# Patient Record
Sex: FEMALE | URBAN - METROPOLITAN AREA
[De-identification: names, ages, dates, MRNs, and addresses within clinical notes are randomized per-mention and may not be internally consistent; named-entity substitution may affect disease eponyms.]

---

## 2019-07-16 ENCOUNTER — OFFICE VISIT (OUTPATIENT)
Dept: FAMILY MEDICINE CLINIC | Age: 39
End: 2019-07-16

## 2019-07-16 VITALS
WEIGHT: 208 LBS | RESPIRATION RATE: 17 BRPM | OXYGEN SATURATION: 97 % | HEART RATE: 67 BPM | DIASTOLIC BLOOD PRESSURE: 87 MMHG | HEIGHT: 65 IN | TEMPERATURE: 97.4 F | BODY MASS INDEX: 34.66 KG/M2 | SYSTOLIC BLOOD PRESSURE: 129 MMHG

## 2019-07-16 DIAGNOSIS — E66.9 OBESITY (BMI 30.0-34.9): ICD-10-CM

## 2019-07-16 DIAGNOSIS — R31.21 ASYMPTOMATIC MICROSCOPIC HEMATURIA: ICD-10-CM

## 2019-07-16 DIAGNOSIS — Z00.00 ANNUAL PHYSICAL EXAM: Primary | ICD-10-CM

## 2019-07-16 NOTE — PROGRESS NOTES
Alissa Vanegas     Chief Complaint   Patient presents with   [address]    Physical     Vitals:    19 0906   BP: 129/87   Pulse: 67   Resp: 17   Temp: 97.4 °F (36.3 °C)   TempSrc: Oral   SpO2: 97%   Weight: 208 lb (94.3 kg)   Height: 5' 5\" (1.651 m)   PainSc:   0 - No pain   LMP: 2019         HPI: she is here to establish acre and get annual physical has not seen a doctor in 10 years. She had pap 10 years ago    works as LPN ,generally healthy with no medications , does not smoke  not on any medications  And has no acute complains     History reviewed. No pertinent past medical history. Past Surgical History:   Procedure Laterality Date    HX  SECTION       Social History     Tobacco Use    Smoking status: Never Smoker    Smokeless tobacco: Never Used   Substance Use Topics    Alcohol use: Not Currently     Comment: occasionally       Family History   Problem Relation Age of Onset    Kidney Disease Mother     Hypertension Mother     Cancer Father        Review of Systems   Constitutional: Negative for chills, fever, malaise/fatigue and weight loss. HENT: Negative for congestion, ear discharge, ear pain, hearing loss, nosebleeds, sinus pain and sore throat. Eyes: Negative for blurred vision, double vision and discharge. Respiratory: Negative for cough, hemoptysis, sputum production, shortness of breath and wheezing. Cardiovascular: Negative for chest pain, palpitations, claudication and leg swelling. Gastrointestinal: Negative for abdominal pain, blood in stool, constipation, diarrhea, nausea and vomiting. Genitourinary: Negative for dysuria, flank pain, frequency, hematuria and urgency. Musculoskeletal: Negative for back pain, joint pain, myalgias and neck pain. Skin: Negative for itching and rash. Neurological: Negative for dizziness, tingling, tremors, sensory change, speech change, focal weakness, seizures, weakness and headaches.    Psychiatric/Behavioral: Negative for depression, hallucinations, substance abuse and suicidal ideas. The patient is not nervous/anxious and does not have insomnia. Physical Exam   Constitutional: She is oriented to person, place, and time. She appears well-developed and well-nourished. No distress. HENT:   Head: Normocephalic and atraumatic. Mouth/Throat: Oropharynx is clear and moist. No oropharyngeal exudate. Eyes: Pupils are equal, round, and reactive to light. Conjunctivae are normal. Right eye exhibits no discharge. Left eye exhibits no discharge. No scleral icterus. Neck: Normal range of motion. Neck supple. No thyromegaly present. Cardiovascular: Normal rate, regular rhythm and normal heart sounds. No murmur heard. Pulmonary/Chest: Effort normal and breath sounds normal. No respiratory distress. She has no wheezes. She has no rales. She exhibits no tenderness. Abdominal: Soft. Bowel sounds are normal. She exhibits no distension. There is no tenderness. There is no rebound. Musculoskeletal: Normal range of motion. She exhibits no edema, tenderness or deformity. Lymphadenopathy:     She has no cervical adenopathy. Neurological: She is alert and oriented to person, place, and time. No cranial nerve deficit. Coordination normal.   Skin: Skin is warm and dry. No rash noted. She is not diaphoretic. No erythema. No pallor. Psychiatric: She has a normal mood and affect. Her behavior is normal. Judgment and thought content normal.   Nursing note and vitals reviewed. Assessment and plan     Plan of care has been discussed with the patient, he agrees to the plan and verbalized understanding. All his questions were answered  More than 50% of the time spent in this visit was counseling the patient about  illness and treatment options         1. Annual physical exam    - CBC WITH AUTOMATED DIFF; Future  - METABOLIC PANEL, COMPREHENSIVE; Future  - LIPID PANEL; Future  - URINALYSIS W/MICROSCOPIC;  Future  - URINALYSIS W/MICROSCOPIC  - LIPID PANEL  - METABOLIC PANEL, COMPREHENSIVE  - CBC WITH AUTOMATED DIFF    2. Obesity (BMI 30.0-34.9)  I have reviewed/discussed the above normal BMI with the patient. I have recommended the following interventions: dietary management education, guidance, and counseling, encourage exercise and monitor weight . Inga Sterling Patient declined dietitian referral today. Patient Active Problem List    Diagnosis Date Noted    Obesity (BMI 30.0-34.9) 07/16/2019     Results for orders placed or performed in visit on 07/16/19   URINALYSIS W/MICROSCOPIC   Result Value Ref Range    Specific Gravity 1.024 1.005 - 1.03    pH (UA) 7.0 5.0 - 8.0 pH    Protein Negative Negative, mg/dL    Glucose Negative Negative mg/dL    Ketone Negative Negative, mg/dL    Bilirubin Negative Negative    Blood Small (A) Negative    Nitrites Negative Negative    Leukocyte Esterase Negative Negative    Urobilinogen 2.0 (H) <2.0 mg/dL    WBC 0-2 0 - 2 /hpf    RBC 10-20 (A) Negative, /hpf    Epithelial cells 0-2 0 - 2 /hpf   LIPID PANEL   Result Value Ref Range    Triglyceride 43 40 - 149 mg/dL    HDL Cholesterol 51 40 - 59 mg/dL    Cholesterol, total 152 110 - 200 mg/dL    CHOLESTEROL/HDL 3.0 0.0 - 5.0    LDL, calculated 93 50 - 99 mg/dL    VLDL, calculated 9 8 - 30 mg/dL   METABOLIC PANEL, COMPREHENSIVE   Result Value Ref Range    Glucose 90 70 - 99 mg/dL    BUN 9 6 - 22 mg/dL    Creatinine 0.4 (L) 0.5 - 1.2 mg/dL    Sodium 141 133 - 145 mmol/L    Potassium 4.6 3.5 - 5.5 mmol/L    Chloride 104 98 - 110 mmol/L    CO2 26 20 - 32 mmol/L    AST (SGOT) 17 10 - 37 U/L    ALT (SGPT) 12 5 - 40 U/L    Alk.  phosphatase 115 25 - 115 U/L    Bilirubin, total 0.3 0.2 - 1.2 mg/dL    Calcium 9.3 8.4 - 10.5 mg/dL    Protein, total 6.7 6.4 - 8.3 g/dL    Albumin 4.0 3.5 - 5.0 g/dL    A-G Ratio 1.5 1.1 - 2.6 ratio    Globulin 2.7 2.0 - 4.0 g/dL    Anion gap 11.0 mmol/L    GFRAA >60.0 >60.0    GFRNA >60.0 >60.0   CBC WITH AUTOMATED DIFF Result Value Ref Range    WBC 4.6 4.0 - 11.0 K/uL    RBC 4.14 3.80 - 5.20 M/uL    HGB 11.6 (L) 11.7 - 15.5 g/dL    HCT 36.6 35.1 - 46.5 %    MCV 88 80 - 95 fL    MCH 28 26 - 34 pg    MCHC 32 31 - 36 g/dL    RDW 14.8 10.0 - 15.5 %    PLATELET 387 510 - 487 K/uL    MPV 11.0 9.0 - 13.0 fL    NEUTROPHILS 50 40 - 75 %    Lymphocytes 42 20 - 45 %    MONOCYTES 6 3 - 12 %    EOSINOPHILS 2 0 - 6 %    BASOPHILS 0 0 - 2 %    ABS. NEUTROPHILS 2.3 1.8 - 7.7 K/uL    ABSOLUTE LYMPHOCYTE COUNT 1.9 1.0 - 4.8 K/uL    ABS. MONOCYTES 0.3 0.1 - 1.0 K/uL    ABS. EOSINOPHILS 0.1 0.0 - 0.5 K/uL    ABS. BASOPHILS 0.0 0.0 - 0.2 K/uL     Office Visit on 07/16/2019   Component Date Value Ref Range Status    Specific Gravity 07/16/2019 1.024  1.005 - 1.03 Final    pH (UA) 07/16/2019 7.0  5.0 - 8.0 pH Final    Protein 07/16/2019 Negative  Negative, mg/dL Final    Glucose 07/16/2019 Negative  Negative mg/dL Final    Ketone 07/16/2019 Negative  Negative, mg/dL Final    Bilirubin 07/16/2019 Negative  Negative Final    Blood 07/16/2019 Small* Negative Final    Nitrites 07/16/2019 Negative  Negative Final    Leukocyte Esterase 07/16/2019 Negative  Negative Final    Urobilinogen 07/16/2019 2.0* <2.0 mg/dL Final    WBC 07/16/2019 0-2  0 - 2 /hpf Final    RBC 07/16/2019 10-20* Negative, /hpf Final    Epithelial cells 07/16/2019 0-2  0 - 2 /hpf Final    Triglyceride 07/16/2019 43  40 - 149 mg/dL Final    HDL Cholesterol 07/16/2019 51  40 - 59 mg/dL Final    Cholesterol, total 07/16/2019 152  110 - 200 mg/dL Final    CHOLESTEROL/HDL 07/16/2019 3.0  0.0 - 5.0 Final    LDL, calculated 07/16/2019 93  50 - 99 mg/dL Final    VLDL, calculated 07/16/2019 9  8 - 30 mg/dL Final    Comment: Cholesterol Recommended NCEP guidelines in mg/dL:  Less than 200            Desirable  200 - 239                Borderline High  Greater than or  = 240   High  Please Note:  Total Chol/HDL Ratio                   Men     Women  1/2 Avg.  Risk    3.4     3.3 Avg. Risk    5.0     4.4  2X  Avg. Risk    9.6     7.1  3X  Avg. Risk   23.4    11.0      Glucose 07/16/2019 90  70 - 99 mg/dL Final    BUN 07/16/2019 9  6 - 22 mg/dL Final    Creatinine 07/16/2019 0.4* 0.5 - 1.2 mg/dL Final    Sodium 07/16/2019 141  133 - 145 mmol/L Final    Potassium 07/16/2019 4.6  3.5 - 5.5 mmol/L Final    Chloride 07/16/2019 104  98 - 110 mmol/L Final    CO2 07/16/2019 26  20 - 32 mmol/L Final    AST (SGOT) 07/16/2019 17  10 - 37 U/L Final    ALT (SGPT) 07/16/2019 12  5 - 40 U/L Final    Alk. phosphatase 07/16/2019 115  25 - 115 U/L Final    Bilirubin, total 07/16/2019 0.3  0.2 - 1.2 mg/dL Final    Calcium 07/16/2019 9.3  8.4 - 10.5 mg/dL Final    Protein, total 07/16/2019 6.7  6.4 - 8.3 g/dL Final    Albumin 07/16/2019 4.0  3.5 - 5.0 g/dL Final    A-G Ratio 07/16/2019 1.5  1.1 - 2.6 ratio Final    Globulin 07/16/2019 2.7  2.0 - 4.0 g/dL Final    Anion gap 07/16/2019 11.0  mmol/L Final    Comment: Estimated GFR results are reported in mL/min/1.73 sq.m. by the MDRD equation. This eGFR is validated for stable chronic renal failure patients. This   equation  is unreliable in acute illness or patients with normal renal function.  GFRAA 07/16/2019 >60.0  >60.0 Final    GFRNA 07/16/2019 >60.0  >60.0 Final    WBC 07/16/2019 4.6  4.0 - 11.0 K/uL Final    RBC 07/16/2019 4.14  3.80 - 5.20 M/uL Final    HGB 07/16/2019 11.6* 11.7 - 15.5 g/dL Final    HCT 07/16/2019 36.6  35.1 - 46.5 % Final    MCV 07/16/2019 88  80 - 95 fL Final    MCH 07/16/2019 28  26 - 34 pg Final    MCHC 07/16/2019 32  31 - 36 g/dL Final    RDW 07/16/2019 14.8  10.0 - 15.5 % Final    PLATELET 41/04/2206 481  140 - 440 K/uL Final    MPV 07/16/2019 11.0  9.0 - 13.0 fL Final    NEUTROPHILS 07/16/2019 50  40 - 75 % Final    Lymphocytes 07/16/2019 42  20 - 45 % Final    MONOCYTES 07/16/2019 6  3 - 12 % Final    EOSINOPHILS 07/16/2019 2  0 - 6 % Final    BASOPHILS 07/16/2019 0  0 - 2 % Final    ABS. NEUTROPHILS 07/16/2019 2.3  1.8 - 7.7 K/uL Final    ABSOLUTE LYMPHOCYTE COUNT 07/16/2019 1.9  1.0 - 4.8 K/uL Final    ABS. MONOCYTES 07/16/2019 0.3  0.1 - 1.0 K/uL Final    ABS. EOSINOPHILS 07/16/2019 0.1  0.0 - 0.5 K/uL Final    ABS.  BASOPHILS 07/16/2019 0.0  0.0 - 0.2 K/uL Final          Follow-up and Dispositions    · Return in about 1 month (around 8/16/2019), or pap and physical .

## 2019-07-16 NOTE — PROGRESS NOTES
Yaquelin Dorado is a 34 y.o. female presents to office for physical    Medication list has been reviewed with Yaquelin Dorado and updated as of today's date     Health Maintenance items with a due date reviewed with patient:  Health Maintenance Due   Topic Date Due    DTaP/Tdap/Td series (1 - Tdap) 11/19/1999    PAP AKA CERVICAL CYTOLOGY  11/19/1999

## 2019-07-17 LAB
A-G RATIO,AGRAT: 1.5 RATIO (ref 1.1–2.6)
ABSOLUTE LYMPHOCYTE COUNT, 10803: 1.9 K/UL (ref 1–4.8)
ALBUMIN SERPL-MCNC: 4 G/DL (ref 3.5–5)
ALP SERPL-CCNC: 115 U/L (ref 25–115)
ALT SERPL-CCNC: 12 U/L (ref 5–40)
ANION GAP SERPL CALC-SCNC: 11 MMOL/L
AST SERPL W P-5'-P-CCNC: 17 U/L (ref 10–37)
BASOPHILS # BLD: 0 K/UL (ref 0–0.2)
BASOPHILS NFR BLD: 0 % (ref 0–2)
BILIRUB SERPL-MCNC: 0.3 MG/DL (ref 0.2–1.2)
BILIRUB UR QL: NEGATIVE
BUN SERPL-MCNC: 9 MG/DL (ref 6–22)
CALCIUM SERPL-MCNC: 9.3 MG/DL (ref 8.4–10.5)
CHLORIDE SERPL-SCNC: 104 MMOL/L (ref 98–110)
CHOLEST SERPL-MCNC: 152 MG/DL (ref 110–200)
CO2 SERPL-SCNC: 26 MMOL/L (ref 20–32)
CREAT SERPL-MCNC: 0.4 MG/DL (ref 0.5–1.2)
EOSINOPHIL # BLD: 0.1 K/UL (ref 0–0.5)
EOSINOPHIL NFR BLD: 2 % (ref 0–6)
EPITHELIAL,EPSU: ABNORMAL /HPF (ref 0–2)
ERYTHROCYTE [DISTWIDTH] IN BLOOD BY AUTOMATED COUNT: 14.8 % (ref 10–15.5)
GFRAA, 66117: >60
GFRNA, 66118: >60
GLOBULIN,GLOB: 2.7 G/DL (ref 2–4)
GLUCOSE SERPL-MCNC: 90 MG/DL (ref 70–99)
GLUCOSE UR QL: NEGATIVE MG/DL
GRANULOCYTES,GRANS: 50 % (ref 40–75)
HCT VFR BLD AUTO: 36.6 % (ref 35.1–46.5)
HDLC SERPL-MCNC: 3 MG/DL (ref 0–5)
HDLC SERPL-MCNC: 51 MG/DL (ref 40–59)
HGB BLD-MCNC: 11.6 G/DL (ref 11.7–15.5)
HGB UR QL STRIP: ABNORMAL
KETONES UR QL STRIP.AUTO: NEGATIVE MG/DL
LDLC SERPL CALC-MCNC: 93 MG/DL (ref 50–99)
LEUKOCYTE ESTERASE: NEGATIVE
LYMPHOCYTES, LYMLT: 42 % (ref 20–45)
MCH RBC QN AUTO: 28 PG (ref 26–34)
MCHC RBC AUTO-ENTMCNC: 32 G/DL (ref 31–36)
MCV RBC AUTO: 88 FL (ref 80–95)
MONOCYTES # BLD: 0.3 K/UL (ref 0.1–1)
MONOCYTES NFR BLD: 6 % (ref 3–12)
NEUTROPHILS # BLD AUTO: 2.3 K/UL (ref 1.8–7.7)
NITRITE UR QL STRIP.AUTO: NEGATIVE
PH UR STRIP: 7 PH (ref 5–8)
PLATELET # BLD AUTO: 296 K/UL (ref 140–440)
PMV BLD AUTO: 11 FL (ref 9–13)
POTASSIUM SERPL-SCNC: 4.6 MMOL/L (ref 3.5–5.5)
PROT SERPL-MCNC: 6.7 G/DL (ref 6.4–8.3)
PROT UR QL STRIP: NEGATIVE MG/DL
RBC # BLD AUTO: 4.14 M/UL (ref 3.8–5.2)
RBC #/AREA URNS HPF: ABNORMAL /HPF
SODIUM SERPL-SCNC: 141 MMOL/L (ref 133–145)
SP GR UR: 1.02 (ref 1–1.03)
TRIGL SERPL-MCNC: 43 MG/DL (ref 40–149)
UROBILINOGEN UR STRIP-MCNC: 2 MG/DL
VLDLC SERPL CALC-MCNC: 9 MG/DL (ref 8–30)
WBC # BLD AUTO: 4.6 K/UL (ref 4–11)
WBC URNS QL MICRO: ABNORMAL /HPF (ref 0–2)

## 2019-07-22 DIAGNOSIS — R31.21 ASYMPTOMATIC MICROSCOPIC HEMATURIA: ICD-10-CM

## 2019-07-22 NOTE — PROGRESS NOTES
Results are within normal limits    Except some blood in the urine patient was menstruating please ignore, otherwise patient repeating urinalysis.

## 2019-08-02 ENCOUNTER — TELEPHONE (OUTPATIENT)
Dept: FAMILY MEDICINE CLINIC | Age: 39
End: 2019-08-02

## 2019-08-02 NOTE — TELEPHONE ENCOUNTER
----- Message from Carolina Leavitt MD sent at 7/22/2019  6:14 AM EDT -----  Results are within normal limits    Except some blood in the urine patient was menstruating please ignore, otherwise patient repeating urinalysis.

## 2021-06-10 ENCOUNTER — OFFICE VISIT (OUTPATIENT)
Dept: FAMILY MEDICINE CLINIC | Age: 41
End: 2021-06-10
Payer: COMMERCIAL

## 2021-06-10 VITALS
TEMPERATURE: 97.6 F | BODY MASS INDEX: 34.16 KG/M2 | RESPIRATION RATE: 16 BRPM | WEIGHT: 205 LBS | OXYGEN SATURATION: 100 % | HEART RATE: 81 BPM | DIASTOLIC BLOOD PRESSURE: 87 MMHG | HEIGHT: 65 IN | SYSTOLIC BLOOD PRESSURE: 135 MMHG

## 2021-06-10 DIAGNOSIS — Z00.00 ANNUAL PHYSICAL EXAM: Primary | ICD-10-CM

## 2021-06-10 DIAGNOSIS — Z11.59 NEED FOR HEPATITIS C SCREENING TEST: ICD-10-CM

## 2021-06-10 DIAGNOSIS — Z12.31 ENCOUNTER FOR SCREENING MAMMOGRAM FOR MALIGNANT NEOPLASM OF BREAST: ICD-10-CM

## 2021-06-10 DIAGNOSIS — E66.9 OBESITY (BMI 30.0-34.9): ICD-10-CM

## 2021-06-10 PROCEDURE — 99396 PREV VISIT EST AGE 40-64: CPT | Performed by: LEGAL MEDICINE

## 2021-06-10 NOTE — PROGRESS NOTES
Mame Helm is a 41 y.o. female (: 1980) presenting to address:    Chief Complaint   Patient presents with    Blood Pressure Check     follow up        Vitals:    06/10/21 1548   BP: 135/87   Pulse: 81   Resp: 16   Temp: 97.6 °F (36.4 °C)   TempSrc: Temporal   SpO2: 100%   Weight: 205 lb (93 kg)   Height: 5' 5\" (1.651 m)   PainSc:   0 - No pain   LMP: 2021       Is someone accompanying this pt? NO    Is the patient using any DME equipment during OV? NO    Hearing/Vision:   No exam data present    Learning Assessment:     Learning Assessment 2019   PRIMARY LEARNER Patient   PRIMARY LANGUAGE ENGLISH   LEARNER PREFERENCE PRIMARY DEMONSTRATION   ANSWERED BY patient   RELATIONSHIP SELF     Depression Screening:     3 most recent PHQ Screens 6/10/2021   Little interest or pleasure in doing things Not at all   Feeling down, depressed, irritable, or hopeless Not at all   Total Score PHQ 2 0     Fall Risk Assessment:     Fall Risk Assessment, last 12 mths 2019   Able to walk? Yes   Fall in past 12 months? No     Coordination of Care Questionaire:   1. Have you been to the ER, urgent care clinic since your last visit? Hospitalized since your last visit? NO    2. Have you seen or consulted any other health care providers outside of the [address] [place] [address] since your last visit? Include any pap smears or colon screening. NO    Advanced Directive:   1. Do you have an Advanced Directive? NO    2. Would you like information on Advanced Directives?  NO

## 2021-06-10 NOTE — PROGRESS NOTES
Jae Worrell     Chief Complaint   Patient presents with    Blood Pressure Check     follow up      Vitals:    06/10/21 1548   BP: 135/87   Pulse: 81   Resp: 16   Temp: 97.6 °F (36.4 °C)   TempSrc: Temporal   SpO2: 100%   Weight: 205 lb (93 kg)   Height: 5' 5\" (1.651 m)   PainSc:   0 - No pain   LMP: 2021         HPI:Pateint is here for  annual physical , BP was high at home last week now is better after she stopped eating processed  Food, feels better and BP is better , normal reading today     Not smoking no alcohol and will   ,Be starting in home walking program soon for weight loss    She is due for pap smear and mammogram   Would like to get it next visit     No past medical history on file. Past Surgical History:   Procedure Laterality Date    HX  SECTION       Social History     Tobacco Use    Smoking status: Never Smoker    Smokeless tobacco: Never Used   Substance Use Topics    Alcohol use: Not Currently     Comment: occasionally       Family History   Problem Relation Age of Onset    Kidney Disease Mother     Hypertension Mother     Cancer Father        Review of Systems   Constitutional: Negative for chills, fever, malaise/fatigue and weight loss. HENT: Negative for congestion, ear discharge, ear pain, hearing loss, nosebleeds, sinus pain and sore throat. Eyes: Negative for blurred vision, double vision, photophobia, pain and discharge. Respiratory: Negative for cough, hemoptysis, sputum production, shortness of breath and wheezing. Cardiovascular: Negative for chest pain, palpitations, claudication and leg swelling. Gastrointestinal: Negative for abdominal pain, constipation, diarrhea, nausea and vomiting. Genitourinary: Negative for dysuria, flank pain, frequency, hematuria and urgency. Musculoskeletal: Negative for back pain, falls, joint pain, myalgias and neck pain. Skin: Negative for itching and rash.    Neurological: Negative for dizziness, tingling, sensory change, speech change, focal weakness, weakness and headaches. Psychiatric/Behavioral: Negative for depression, hallucinations, memory loss, substance abuse and suicidal ideas. The patient is not nervous/anxious and does not have insomnia. Physical Exam  Vitals and nursing note reviewed. Constitutional:       General: She is not in acute distress. Appearance: She is well-developed. She is not diaphoretic. HENT:      Head: Normocephalic and atraumatic. Eyes:      General: No scleral icterus. Right eye: No discharge. Left eye: No discharge. Neck:      Thyroid: No thyromegaly. Cardiovascular:      Rate and Rhythm: Normal rate and regular rhythm. Heart sounds: Normal heart sounds. Pulmonary:      Effort: Pulmonary effort is normal. No respiratory distress. Breath sounds: Normal breath sounds. No wheezing or rales. Chest:      Chest wall: No tenderness. Abdominal:      General: There is no distension. Palpations: Abdomen is soft. Tenderness: There is no abdominal tenderness. There is no rebound. Musculoskeletal:         General: No tenderness or deformity. Normal range of motion. Lymphadenopathy:      Cervical: No cervical adenopathy. Skin:     General: Skin is warm and dry. Coloration: Skin is not pale. Findings: No erythema or rash. Neurological:      Mental Status: She is alert and oriented to person, place, and time. Cranial Nerves: No cranial nerve deficit. Coordination: Coordination normal.   Psychiatric:         Behavior: Behavior normal.         Thought Content: Thought content normal.         Judgment: Judgment normal.        Breasts: breasts appear normal, no suspicious masses, no skin or nipple changes or axillary nodes. Assessment and plan     Plan of care has been discussed with the patient, he agrees to the plan and verbalized understanding.   All his questions were answered  More than 50% of the time spent in this visit was counseling the patient about  illness and treatment options         1. Obesity (BMI 30.0-34. 9)  Lifestyle modification has been discussed with patient in details, decrease carbohydrates, decrease or eliminate sugar intake, gradually increase physical activity as tolerated to be about 4 to 5 hours a week. 2. Annual physical exam    - LIPID PANEL; Future  - METABOLIC PANEL, COMPREHENSIVE; Future  - CBC WITH AUTOMATED DIFF; Future    3. Need for hepatitis C screening test    - HEPATITIS C AB; Future    4. Encounter for screening mammogram for malignant neoplasm of breast    - [facility]EN MAMMO BI SCREENING INCL CAD; Future    Current Outpatient Medications   Medication Sig Dispense Refill    BABY ASPIRIN PO Take  by mouth.          Patient Active Problem List    Diagnosis Date Noted    Obesity (BMI 30.0-34.9) 07/16/2019     Results for orders placed or performed in visit on 07/16/19   URINALYSIS W/MICROSCOPIC   Result Value Ref Range    Specific Gravity 1.024 1.005 - 1.03    pH (UA) 7.0 5.0 - 8.0 pH    Protein Negative Negative, mg/dL    Glucose Negative Negative mg/dL    Ketone Negative Negative, mg/dL    Bilirubin Negative Negative    Blood Small (A) Negative    Nitrites Negative Negative    Leukocyte Esterase Negative Negative    Urobilinogen 2.0 (H) <2.0 mg/dL    WBC 0-2 0 - 2 /hpf    RBC 10-20 (A) Negative, /hpf    Epithelial cells 0-2 0 - 2 /hpf   LIPID PANEL   Result Value Ref Range    Triglyceride 43 40 - 149 mg/dL    HDL Cholesterol 51 40 - 59 mg/dL    Cholesterol, total 152 110 - 200 mg/dL    CHOLESTEROL/HDL 3.0 0.0 - 5.0    LDL, calculated 93 50 - 99 mg/dL    VLDL, calculated 9 8 - 30 mg/dL   METABOLIC PANEL, COMPREHENSIVE   Result Value Ref Range    Glucose 90 70 - 99 mg/dL    BUN 9 6 - 22 mg/dL    Creatinine 0.4 (L) 0.5 - 1.2 mg/dL    Sodium 141 133 - 145 mmol/L    Potassium 4.6 3.5 - 5.5 mmol/L    Chloride 104 98 - 110 mmol/L    CO2 26 20 - 32 mmol/L    AST (SGOT) 17 10 - 37 U/L    ALT (SGPT) 12 5 - 40 U/L    Alk. phosphatase 115 25 - 115 U/L    Bilirubin, total 0.3 0.2 - 1.2 mg/dL    Calcium 9.3 8.4 - 10.5 mg/dL    Protein, total 6.7 6.4 - 8.3 g/dL    Albumin 4.0 3.5 - 5.0 g/dL    A-G Ratio 1.5 1.1 - 2.6 ratio    Globulin 2.7 2.0 - 4.0 g/dL    Anion gap 11.0 mmol/L    GFRAA >60.0 >60.0    GFRNA >60.0 >60.0   CBC WITH AUTOMATED DIFF   Result Value Ref Range    WBC 4.6 4.0 - 11.0 K/uL    RBC 4.14 3.80 - 5.20 M/uL    HGB 11.6 (L) 11.7 - 15.5 g/dL    HCT 36.6 35.1 - 46.5 %    MCV 88 80 - 95 fL    MCH 28 26 - 34 pg    MCHC 32 31 - 36 g/dL    RDW 14.8 10.0 - 15.5 %    PLATELET 613 527 - 775 K/uL    MPV 11.0 9.0 - 13.0 fL    NEUTROPHILS 50 40 - 75 %    Lymphocytes 42 20 - 45 %    MONOCYTES 6 3 - 12 %    EOSINOPHILS 2 0 - 6 %    BASOPHILS 0 0 - 2 %    ABS. NEUTROPHILS 2.3 1.8 - 7.7 K/uL    ABSOLUTE LYMPHOCYTE COUNT 1.9 1.0 - 4.8 K/uL    ABS. MONOCYTES 0.3 0.1 - 1.0 K/uL    ABS. EOSINOPHILS 0.1 0.0 - 0.5 K/uL    ABS. BASOPHILS 0.0 0.0 - 0.2 K/uL     No visits with results within 3 Month(s) from this visit.    Latest known visit with results is:   Office Visit on 07/16/2019   Component Date Value Ref Range Status    Specific Gravity 07/16/2019 1.024  1.005 - 1.03 Final    pH (UA) 07/16/2019 7.0  5.0 - 8.0 pH Final    Protein 07/16/2019 Negative  Negative, mg/dL Final    Glucose 07/16/2019 Negative  Negative mg/dL Final    Ketone 07/16/2019 Negative  Negative, mg/dL Final    Bilirubin 07/16/2019 Negative  Negative Final    Blood 07/16/2019 Small* Negative Final    Nitrites 07/16/2019 Negative  Negative Final    Leukocyte Esterase 07/16/2019 Negative  Negative Final    Urobilinogen 07/16/2019 2.0* <2.0 mg/dL Final    WBC 07/16/2019 0-2  0 - 2 /hpf Final    RBC 07/16/2019 10-20* Negative, /hpf Final    Epithelial cells 07/16/2019 0-2  0 - 2 /hpf Final    Triglyceride 07/16/2019 43  40 - 149 mg/dL Final    HDL Cholesterol 07/16/2019 51  40 - 59 mg/dL Final    Cholesterol, total 07/16/2019 152 110 - 200 mg/dL Final    CHOLESTEROL/HDL 07/16/2019 3.0  0.0 - 5.0 Final    LDL, calculated 07/16/2019 93  50 - 99 mg/dL Final    VLDL, calculated 07/16/2019 9  8 - 30 mg/dL Final    Comment: Cholesterol Recommended NCEP guidelines in mg/dL:  Less than 200            Desirable  200 - 239                Borderline High  Greater than or  = 240   High  Please Note:  Total Chol/HDL Ratio                   Men     Women  1/2 Avg. Risk    3.4     3.3      Avg. Risk    5.0     4.4  2X  Avg. Risk    9.6     7.1  3X  Avg. Risk   23.4    11.0      Glucose 07/16/2019 90  70 - 99 mg/dL Final    BUN 07/16/2019 9  6 - 22 mg/dL Final    Creatinine 07/16/2019 0.4* 0.5 - 1.2 mg/dL Final    Sodium 07/16/2019 141  133 - 145 mmol/L Final    Potassium 07/16/2019 4.6  3.5 - 5.5 mmol/L Final    Chloride 07/16/2019 104  98 - 110 mmol/L Final    CO2 07/16/2019 26  20 - 32 mmol/L Final    AST (SGOT) 07/16/2019 17  10 - 37 U/L Final    ALT (SGPT) 07/16/2019 12  5 - 40 U/L Final    Alk. phosphatase 07/16/2019 115  25 - 115 U/L Final    Bilirubin, total 07/16/2019 0.3  0.2 - 1.2 mg/dL Final    Calcium 07/16/2019 9.3  8.4 - 10.5 mg/dL Final    Protein, total 07/16/2019 6.7  6.4 - 8.3 g/dL Final    Albumin 07/16/2019 4.0  3.5 - 5.0 g/dL Final    A-G Ratio 07/16/2019 1.5  1.1 - 2.6 ratio Final    Globulin 07/16/2019 2.7  2.0 - 4.0 g/dL Final    Anion gap 07/16/2019 11.0  mmol/L Final    Comment: Estimated GFR results are reported in mL/min/1.73 sq.m. by the MDRD equation. This eGFR is validated for stable chronic renal failure patients. This   equation  is unreliable in acute illness or patients with normal renal function.       GFRAA 07/16/2019 >60.0  >60.0 Final    GFRNA 07/16/2019 >60.0  >60.0 Final    WBC 07/16/2019 4.6  4.0 - 11.0 K/uL Final    RBC 07/16/2019 4.14  3.80 - 5.20 M/uL Final    HGB 07/16/2019 11.6* 11.7 - 15.5 g/dL Final    HCT 07/16/2019 36.6  35.1 - 46.5 % Final    MCV 07/16/2019 88  80 - 95 fL Final  MCH 07/16/2019 28  26 - 34 pg Final    MCHC 07/16/2019 32  31 - 36 g/dL Final    RDW 07/16/2019 14.8  10.0 - 15.5 % Final    PLATELET 93/44/1849 496  140 - 440 K/uL Final    MPV 07/16/2019 11.0  9.0 - 13.0 fL Final    NEUTROPHILS 07/16/2019 50  40 - 75 % Final    Lymphocytes 07/16/2019 42  20 - 45 % Final    MONOCYTES 07/16/2019 6  3 - 12 % Final    EOSINOPHILS 07/16/2019 2  0 - 6 % Final    BASOPHILS 07/16/2019 0  0 - 2 % Final    ABS. NEUTROPHILS 07/16/2019 2.3  1.8 - 7.7 K/uL Final    ABSOLUTE LYMPHOCYTE COUNT 07/16/2019 1.9  1.0 - 4.8 K/uL Final    ABS. MONOCYTES 07/16/2019 0.3  0.1 - 1.0 K/uL Final    ABS. EOSINOPHILS 07/16/2019 0.1  0.0 - 0.5 K/uL Final    ABS. BASOPHILS 07/16/2019 0.0  0.0 - 0.2 K/uL Final          Follow-up and Dispositions    · Return in about 2 months (around 8/10/2021) for for well women exam// need to schedule labs .

## 2021-06-18 ENCOUNTER — APPOINTMENT (OUTPATIENT)
Dept: FAMILY MEDICINE CLINIC | Age: 41
End: 2021-06-18

## 2021-06-18 DIAGNOSIS — Z11.59 NEED FOR HEPATITIS C SCREENING TEST: ICD-10-CM

## 2021-06-18 DIAGNOSIS — Z00.00 ANNUAL PHYSICAL EXAM: ICD-10-CM

## 2021-06-19 LAB
A-G RATIO,AGRAT: 1.4 RATIO (ref 1.1–2.6)
ABSOLUTE LYMPHOCYTE COUNT, 10803: 2 K/UL (ref 1–4.8)
ALBUMIN SERPL-MCNC: 4 G/DL (ref 3.5–5)
ALP SERPL-CCNC: 96 U/L (ref 25–115)
ALT SERPL-CCNC: 7 U/L (ref 5–40)
ANION GAP SERPL CALC-SCNC: 9 MMOL/L (ref 3–15)
AST SERPL W P-5'-P-CCNC: 10 U/L (ref 10–37)
BASOPHILS # BLD: 0 K/UL (ref 0–0.2)
BASOPHILS NFR BLD: 1 % (ref 0–2)
BILIRUB SERPL-MCNC: 0.4 MG/DL (ref 0.2–1.2)
BUN SERPL-MCNC: 8 MG/DL (ref 6–22)
CALCIUM SERPL-MCNC: 9.5 MG/DL (ref 8.4–10.5)
CHLORIDE SERPL-SCNC: 104 MMOL/L (ref 98–110)
CHOLEST SERPL-MCNC: 139 MG/DL (ref 110–200)
CO2 SERPL-SCNC: 26 MMOL/L (ref 20–32)
CREAT SERPL-MCNC: 0.6 MG/DL (ref 0.5–1.2)
EOSINOPHIL # BLD: 0.1 K/UL (ref 0–0.5)
EOSINOPHIL NFR BLD: 2 % (ref 0–6)
ERYTHROCYTE [DISTWIDTH] IN BLOOD BY AUTOMATED COUNT: 14.2 % (ref 10–15.5)
GFRAA, 66117: >60
GFRNA, 66118: >60
GLOBULIN,GLOB: 2.9 G/DL (ref 2–4)
GLUCOSE SERPL-MCNC: 87 MG/DL (ref 70–99)
GRANULOCYTES,GRANS: 46 % (ref 40–75)
HCT VFR BLD AUTO: 38.4 % (ref 35.1–46.5)
HCV AB SER IA-ACNC: NORMAL
HDLC SERPL-MCNC: 3.2 MG/DL (ref 0–5)
HDLC SERPL-MCNC: 44 MG/DL
HGB BLD-MCNC: 11.9 G/DL (ref 11.7–15.5)
LDL/HDL RATIO,LDHD: ABNORMAL
LDLC SERPL CALC-MCNC: ABNORMAL MG/DL
LYMPHOCYTES, LYMLT: 44 % (ref 20–45)
MCH RBC QN AUTO: 28 PG (ref 26–34)
MCHC RBC AUTO-ENTMCNC: 31 G/DL (ref 31–36)
MCV RBC AUTO: 90 FL (ref 81–99)
MONOCYTES # BLD: 0.4 K/UL (ref 0.1–1)
MONOCYTES NFR BLD: 8 % (ref 3–12)
NEUTROPHILS # BLD AUTO: 2.1 K/UL (ref 1.8–7.7)
NON-HDL CHOLESTEROL, 011976: 95 MG/DL
PLATELET # BLD AUTO: 272 K/UL (ref 140–440)
PMV BLD AUTO: 11.2 FL (ref 9–13)
POTASSIUM SERPL-SCNC: 4.6 MMOL/L (ref 3.5–5.5)
PROT SERPL-MCNC: 6.9 G/DL (ref 6.4–8.3)
RBC # BLD AUTO: 4.27 M/UL (ref 3.8–5.2)
SODIUM SERPL-SCNC: 139 MMOL/L (ref 133–145)
TRIGL SERPL-MCNC: <28 MG/DL (ref 40–149)
VLDLC SERPL CALC-MCNC: ABNORMAL MG/DL
WBC # BLD AUTO: 4.6 K/UL (ref 4–11)

## 2021-10-11 ENCOUNTER — TELEPHONE (OUTPATIENT)
Dept: FAMILY MEDICINE CLINIC | Age: 41
End: 2021-10-11

## 2021-10-11 DIAGNOSIS — Z00.00 ANNUAL PHYSICAL EXAM: Primary | ICD-10-CM

## 2021-10-11 NOTE — TELEPHONE ENCOUNTER
She had physical blood work done in June 21? Patient again Pap smear this time?     At the time of her visit if there is any concern or blood need to be ordered we can address it at that time

## 2021-10-11 NOTE — TELEPHONE ENCOUNTER
Patient called in regards to getting lab work done before her physical on 10/22/2021. I was able to schedule her on 10/18/2021. She needs lab orders in as well as and ECG.     Future Appointments   Date Time Provider Kiara Chandler   10/18/2021  2:30 PM [facility] AMB   10/22/2021 11:00 AM Sydnee Rodrigues MD [facility]

## 2021-10-12 NOTE — TELEPHONE ENCOUNTER
Pt is scheduled for the following:   Future Appointments   Date Time Provider Kiara Chandler   10/22/2021 11:00 AM Douglas Horton MD [facility] AMB

## 2021-10-22 ENCOUNTER — OFFICE VISIT (OUTPATIENT)
Dept: FAMILY MEDICINE CLINIC | Age: 41
End: 2021-10-22
Payer: COMMERCIAL

## 2021-10-22 VITALS
OXYGEN SATURATION: 97 % | HEART RATE: 74 BPM | WEIGHT: 208 LBS | SYSTOLIC BLOOD PRESSURE: 145 MMHG | RESPIRATION RATE: 15 BRPM | BODY MASS INDEX: 34.66 KG/M2 | HEIGHT: 65 IN | TEMPERATURE: 97.8 F | DIASTOLIC BLOOD PRESSURE: 94 MMHG

## 2021-10-22 DIAGNOSIS — R03.0 ELEVATED BP WITHOUT DIAGNOSIS OF HYPERTENSION: ICD-10-CM

## 2021-10-22 DIAGNOSIS — E66.9 OBESITY (BMI 30.0-34.9): ICD-10-CM

## 2021-10-22 DIAGNOSIS — R22.40 LOCALIZED SWELLING OF LOWER LEG: ICD-10-CM

## 2021-10-22 DIAGNOSIS — I83.899 SYMPTOMATIC SPIDER VARICOSE VEIN: ICD-10-CM

## 2021-10-22 DIAGNOSIS — R07.89 ATYPICAL CHEST PAIN: Primary | ICD-10-CM

## 2021-10-22 PROCEDURE — 99214 OFFICE O/P EST MOD 30 MIN: CPT | Performed by: LEGAL MEDICINE

## 2021-10-22 PROCEDURE — 93000 ELECTROCARDIOGRAM COMPLETE: CPT | Performed by: LEGAL MEDICINE

## 2021-10-22 NOTE — PROGRESS NOTES
Tae Welch presents today for   Chief Complaint   Patient presents with    Physical     Visit Vitals  BP (!) 145/94 (BP 1 Location: Right arm, BP Patient Position: Sitting, BP Cuff Size: Large adult)   Pulse 74   Temp 97.8 °F (36.6 °C) (Temporal)   Resp 15   Ht 5' 5\" (1.651 m)   Wt 208 lb (94.3 kg)   SpO2 97%   BMI 34.61 kg/m²       Is someone accompanying this pt? no    Is the patient using any DME equipment during OV? no    Depression Screening:  3 most recent PHQ Screens 10/22/2021   Little interest or pleasure in doing things Not at all   Feeling down, depressed, irritable, or hopeless Not at all   Total Score PHQ 2 0       Learning Assessment:  Learning Assessment 7/16/2019   PRIMARY LEARNER Patient   PRIMARY LANGUAGE ENGLISH   LEARNER PREFERENCE PRIMARY DEMONSTRATION   ANSWERED BY patient   RELATIONSHIP SELF       Travel Screening:    Travel Screening     Question   Response    In the last month, have you been in contact with someone who was confirmed or suspected to have Coronavirus / COVID-19? Yes (Yes, she is a nurse)    Have you had a COVID-19 viral test in the last 14 days? No    Do you have any of the following new or worsening symptoms? None of these    Have you traveled internationally or domestically in the last month? No      Travel History   Travel since 09/22/21     No documented travel since 09/22/21          Health Maintenance reviewed and discussed and ordered per Provider. Health Maintenance Due   Topic Date Due    COVID-19 Vaccine (1) Never done    DTaP/Tdap/Td series (1 - Tdap) Never done    Cervical cancer screen  Never done    Flu Vaccine (1) Never done   . Coordination of Care:  1. Have you been to the ER, urgent care clinic since your last visit? Hospitalized since your last visit? no    2. Have you seen or consulted any other health care providers outside of the [address] [place] [address] since your last visit? Include any pap smears or colon screening. No    PATIENT DECLINED FLU VACCINE

## 2021-10-22 NOTE — PROGRESS NOTES
Natalia Salazar     Chief Complaint   Patient presents with    Physical     Vitals:    10/22/21 1107   BP: (!) 145/94   Pulse: 74   Resp: 15   Temp: 97.8 °F (36.6 °C)   TempSrc: Temporal   SpO2: 97%   Weight: 208 lb (94.3 kg)   Height: 5' 5\" (1.651 m)   PainSc:   0 - No pain   LMP: 10/13/2021         HPI: Patient is here for annual physical examination but she already had her physical in     She is concerned about chest pain that she has had few times, not associated with exertion or shortness of breath or dyspnea, possibly related to taking green tea with lemon, it got relieved after drinking water, there is no palpitations. She also have a borderline elevated blood pressure possible prehypertension  She has no dizziness no blurred vision    She would like to investigate her chest pain episode before getting Covid vaccine which is required by her job now    No past medical history on file. Past Surgical History:   Procedure Laterality Date    HX  SECTION       Social History     Tobacco Use    Smoking status: Never Smoker    Smokeless tobacco: Never Used   Substance Use Topics    Alcohol use: Not Currently     Comment: occasionally       Family History   Problem Relation Age of Onset    Kidney Disease Mother     Hypertension Mother     Cancer Father        Review of Systems   Constitutional: Negative for chills, fever, malaise/fatigue and weight loss. HENT: Negative for congestion, ear discharge, ear pain, hearing loss, nosebleeds, sinus pain and sore throat. Eyes: Negative for blurred vision, double vision and discharge. Respiratory: Negative for cough, hemoptysis, sputum production, shortness of breath and wheezing. Cardiovascular: Negative for chest pain, palpitations, claudication and leg swelling. Gastrointestinal: Negative for abdominal pain, constipation, diarrhea, nausea and vomiting. Genitourinary: Negative for dysuria, frequency and urgency.    Musculoskeletal: Negative for back pain, falls, joint pain, myalgias and neck pain. Skin: Negative for itching and rash. Neurological: Negative for dizziness, tingling, sensory change, speech change, focal weakness, seizures, loss of consciousness, weakness and headaches. Psychiatric/Behavioral: Negative for depression, hallucinations, substance abuse and suicidal ideas. The patient is not nervous/anxious. Physical Exam  Vitals and nursing note reviewed. Constitutional:       General: She is not in acute distress. Appearance: She is well-developed. She is not ill-appearing, toxic-appearing or diaphoretic. HENT:      Head: Normocephalic and atraumatic. Eyes:      General: No scleral icterus. Right eye: No discharge. Left eye: No discharge. Conjunctiva/sclera: Conjunctivae normal.   Neck:      Thyroid: No thyromegaly. Cardiovascular:      Rate and Rhythm: Normal rate and regular rhythm. Heart sounds: Normal heart sounds. Pulmonary:      Effort: Pulmonary effort is normal. No respiratory distress. Breath sounds: Normal breath sounds. No wheezing, rhonchi or rales. Chest:      Chest wall: No tenderness. Abdominal:      General: There is no distension. Palpations: Abdomen is soft. Tenderness: There is no abdominal tenderness. There is no right CVA tenderness, left CVA tenderness, guarding or rebound. Musculoskeletal:         General: Tenderness present. No deformity. Normal range of motion. Cervical back: Normal range of motion and neck supple. No rigidity or tenderness. Lymphadenopathy:      Cervical: No cervical adenopathy. Skin:     General: Skin is warm and dry. Coloration: Skin is not pale. Findings: No erythema or rash. Neurological:      Mental Status: She is alert and oriented to person, place, and time. Cranial Nerves: No cranial nerve deficit. Motor: No weakness.       Coordination: Coordination normal.      Gait: Gait normal. Deep Tendon Reflexes: Reflexes normal.   Psychiatric:         Behavior: Behavior normal.         Thought Content: Thought content normal.         Judgment: Judgment normal.          Assessment and plan     Plan of care has been discussed with the patient, he agrees to the plan and verbalized understanding. All his questions were answered  More than 50% of the time spent in this visit was counseling the patient about  illness and treatment options         1. Obesity (BMI 30.0-34. 9)  Lifestyle modification has been discussed with patient in details, decrease carbohydrates, decrease or eliminate sugar intake, gradually increase physical activity as tolerated to be about 4 to 5 hours a week. 2. Elevated BP without diagnosis of hypertension  Abnormal EKG  - AMB POC EKG ROUTINE W/ 12 LEADS, INTER & REP  - REFERRAL TO CARDIOLOGY    3. Localized swelling of lower leg    Possibly due to spider veins and longstanding      - AMB POC EKG ROUTINE W/ 12 LEADS, INTER & REP    4. Atypical chest pain    - AMB POC EKG ROUTINE W/ 12 LEADS, INTER & REP  - REFERRAL TO CARDIOLOGY    5. Symptomatic spider varicose vein  Advised patient to wear supporting stockings while working    Current Outpatient Medications   Medication Sig Dispense Refill    BABY ASPIRIN PO Take  by mouth.  (Patient not taking: Reported on 10/22/2021)         Patient Active Problem List    Diagnosis Date Noted    Obesity (BMI 30.0-34.9) 07/16/2019     Results for orders placed or performed in visit on 06/18/21   HEPATITIS C AB   Result Value Ref Range    Hep C Virus Ab None Detected None Detec   CBC WITH AUTOMATED DIFF   Result Value Ref Range    WBC 4.6 4.0 - 11.0 K/uL    RBC 4.27 3.80 - 5.20 M/uL    HGB 11.9 11.7 - 15.5 g/dL    HCT 38.4 35.1 - 46.5 %    MCV 90 81 - 99 fL    MCH 28 26 - 34 pg    MCHC 31 31 - 36 g/dL    RDW 14.2 10.0 - 15.5 %    PLATELET 500 731 - 984 K/uL    MPV 11.2 9.0 - 13.0 fL    NEUTROPHILS 46 40 - 75 %    Lymphocytes 44 20 - 45 % MONOCYTES 8 3 - 12 %    EOSINOPHILS 2 0 - 6 %    BASOPHILS 1 0 - 2 %    ABS. NEUTROPHILS 2.1 1.8 - 7.7 K/uL    ABSOLUTE LYMPHOCYTE COUNT 2.0 1.0 - 4.8 K/uL    ABS. MONOCYTES 0.4 0.1 - 1.0 K/uL    ABS. EOSINOPHILS 0.1 0.0 - 0.5 K/uL    ABS. BASOPHILS 0.0 0.0 - 0.2 K/uL   METABOLIC PANEL, COMPREHENSIVE   Result Value Ref Range    Glucose 87 70 - 99 mg/dL    BUN 8 6 - 22 mg/dL    Creatinine 0.6 0.5 - 1.2 mg/dL    Sodium 139 133 - 145 mmol/L    Potassium 4.6 3.5 - 5.5 mmol/L    Chloride 104 98 - 110 mmol/L    CO2 26 20 - 32 mmol/L    AST (SGOT) 10 10 - 37 U/L    ALT (SGPT) 7 5 - 40 U/L    Alk. phosphatase 96 25 - 115 U/L    Bilirubin, total 0.4 0.2 - 1.2 mg/dL    Calcium 9.5 8.4 - 10.5 mg/dL    Protein, total 6.9 6.4 - 8.3 g/dL    Albumin 4.0 3.5 - 5.0 g/dL    A-G Ratio 1.4 1.1 - 2.6 ratio    Globulin 2.9 2.0 - 4.0 g/dL    Anion gap 9.0 3.0 - 15.0 mmol/L    GFRAA >60.0 >60.0    GFRNA >60.0 >60.0   LIPID PANEL   Result Value Ref Range    Triglyceride <28 (L) 40 - 149 mg/dL    HDL Cholesterol 44 >=40 mg/dL    Cholesterol, total 139 110 - 200 mg/dL    CHOLESTEROL/HDL 3.2 0.0 - 5.0    Non-HDL Cholesterol 95 <130 mg/dL    LDL, calculated      VLDL, calculated      LDL/HDL Ratio       No visits with results within 3 Month(s) from this visit.    Latest known visit with results is:   Appointment on 06/18/2021   Component Date Value Ref Range Status    Hep C Virus Ab 06/18/2021 None Detected  None Detec Final    WBC 06/18/2021 4.6  4.0 - 11.0 K/uL Final    RBC 06/18/2021 4.27  3.80 - 5.20 M/uL Final    HGB 06/18/2021 11.9  11.7 - 15.5 g/dL Final    HCT 06/18/2021 38.4  35.1 - 46.5 % Final    MCV 06/18/2021 90  81 - 99 fL Final    MCH 06/18/2021 28  26 - 34 pg Final    MCHC 06/18/2021 31  31 - 36 g/dL Final    RDW 06/18/2021 14.2  10.0 - 15.5 % Final    PLATELET 30/81/0223 494  140 - 440 K/uL Final    MPV 06/18/2021 11.2  9.0 - 13.0 fL Final    NEUTROPHILS 06/18/2021 46  40 - 75 % Final    Lymphocytes 06/18/2021 44 20 - 45 % Final    MONOCYTES 06/18/2021 8  3 - 12 % Final    EOSINOPHILS 06/18/2021 2  0 - 6 % Final    BASOPHILS 06/18/2021 1  0 - 2 % Final    ABS. NEUTROPHILS 06/18/2021 2.1  1.8 - 7.7 K/uL Final    ABSOLUTE LYMPHOCYTE COUNT 06/18/2021 2.0  1.0 - 4.8 K/uL Final    ABS. MONOCYTES 06/18/2021 0.4  0.1 - 1.0 K/uL Final    ABS. EOSINOPHILS 06/18/2021 0.1  0.0 - 0.5 K/uL Final    ABS. BASOPHILS 06/18/2021 0.0  0.0 - 0.2 K/uL Final    Glucose 06/18/2021 87  70 - 99 mg/dL Final    BUN 06/18/2021 8  6 - 22 mg/dL Final    Creatinine 06/18/2021 0.6  0.5 - 1.2 mg/dL Final    Sodium 06/18/2021 139  133 - 145 mmol/L Final    Potassium 06/18/2021 4.6  3.5 - 5.5 mmol/L Final    Chloride 06/18/2021 104  98 - 110 mmol/L Final    CO2 06/18/2021 26  20 - 32 mmol/L Final    AST (SGOT) 06/18/2021 10  10 - 37 U/L Final    ALT (SGPT) 06/18/2021 7  5 - 40 U/L Final    Alk. phosphatase 06/18/2021 96  25 - 115 U/L Final    Bilirubin, total 06/18/2021 0.4  0.2 - 1.2 mg/dL Final    Calcium 06/18/2021 9.5  8.4 - 10.5 mg/dL Final    Protein, total 06/18/2021 6.9  6.4 - 8.3 g/dL Final    Albumin 06/18/2021 4.0  3.5 - 5.0 g/dL Final    A-G Ratio 06/18/2021 1.4  1.1 - 2.6 ratio Final    Globulin 06/18/2021 2.9  2.0 - 4.0 g/dL Final    Anion gap 06/18/2021 9.0  3.0 - 15.0 mmol/L Final    Comment: Anion Gap calculation based on electrolyte reference ranges. Test includes Albumin, Alkaline Phosphatase, ALT, AST, BUN, Calcium, CO2,  Chloride, Creatinine, Glucose, Potassium, Sodium, Total Bilirubin and Total  Protein. Estimated GFR results are reported in mL/min/1.73 sq.m. by the MDRD equation. This eGFR is validated for stable chronic renal failure patients. This   equation  is unreliable in acute illness or patients with normal renal function.               GFRAA 06/18/2021 >60.0  >60.0 Final    GFRNA 06/18/2021 >60.0  >60.0 Final    Triglyceride 06/18/2021 <28* 40 - 149 mg/dL Final    HDL Cholesterol 06/18/2021 44 >=40 mg/dL Final    Cholesterol, total 06/18/2021 139  110 - 200 mg/dL Final    CHOLESTEROL/HDL 06/18/2021 3.2  0.0 - 5.0 Final    Non-HDL Cholesterol 06/18/2021 95  <130 mg/dL Final    LDL, calculated 06/18/2021    Final    Comment: Unable to calculate.  VLDL, calculated 06/18/2021    Final    Comment: Unable to calculate.  LDL/HDL Ratio 06/18/2021    Final    Comment: Unable to calculate. Test includes cholesterol, HDL cholesterol, triglycerides and LDL. Cholesterol Recommended NCEP guidelines in mg/dL:    Less than 200            Desirable  200 - 239                Borderline High  Greater than or  = 240   High      Please Note:  Total Chol/HDL Ratio                     Men     Women  1/2 Avg. Risk    3.4     3.3      Avg. Risk    5.0     4.4  2X  Avg. Risk    9.6     7.1  3X  Avg. Risk   23.4    11.0                  Follow-up and Dispositions    · Return if symptoms worsen or fail to improve.

## 2022-03-25 ENCOUNTER — OFFICE VISIT (OUTPATIENT)
Dept: FAMILY MEDICINE CLINIC | Age: 42
End: 2022-03-25
Payer: COMMERCIAL

## 2022-03-25 ENCOUNTER — APPOINTMENT (OUTPATIENT)
Dept: FAMILY MEDICINE CLINIC | Age: 42
End: 2022-03-25

## 2022-03-25 VITALS
DIASTOLIC BLOOD PRESSURE: 89 MMHG | SYSTOLIC BLOOD PRESSURE: 136 MMHG | RESPIRATION RATE: 16 BRPM | TEMPERATURE: 97.8 F | WEIGHT: 205.2 LBS | BODY MASS INDEX: 34.19 KG/M2 | HEIGHT: 65 IN

## 2022-03-25 DIAGNOSIS — Z01.84 IMMUNITY STATUS TESTING: ICD-10-CM

## 2022-03-25 DIAGNOSIS — R03.0 ELEVATED BP WITHOUT DIAGNOSIS OF HYPERTENSION: Primary | ICD-10-CM

## 2022-03-25 DIAGNOSIS — I83.899 SYMPTOMATIC SPIDER VARICOSE VEIN: ICD-10-CM

## 2022-03-25 PROCEDURE — 99213 OFFICE O/P EST LOW 20 MIN: CPT | Performed by: LEGAL MEDICINE

## 2022-03-25 NOTE — PROGRESS NOTES
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 Past Surgical History:   Procedure Laterality Date    HX  SECTION       Social History     Tobacco Use    Smoking status: Never Smoker    Smokeless tobacco: Never Used   Substance Use Topics    Alcohol use: Not Currently     Comment: occasionally       Family History   Problem Relation Age of Onset    Kidney Disease Mother     Hypertension Mother     Cancer Father        Review of Systems   Constitutional: Negative for chills, fever, malaise/fatigue and weight loss. HENT: Negative for congestion, ear discharge, ear pain, hearing loss, nosebleeds, sinus pain and sore throat. Eyes: Negative for blurred vision, double vision and discharge. Respiratory: Negative for cough and stridor. Cardiovascular: Negative for chest pain, palpitations, claudication and leg swelling. Gastrointestinal: Negative for abdominal pain, constipation, diarrhea, nausea and vomiting. Genitourinary: Negative for dysuria, frequency and urgency. Musculoskeletal: Negative for myalgias. Skin: Negative for itching and rash.    Neurological: Negative for dizziness, tingling, sensory change, speech change, focal weakness, weakness and headaches. Physical Exam  Vitals and nursing note reviewed. Constitutional:       General: She is not in acute distress. Appearance: Normal appearance. She is well-developed. She is not diaphoretic. HENT:      Head: Normocephalic and atraumatic. Right Ear: Tympanic membrane, ear canal and external ear normal. There is no impacted cerumen. Left Ear: Tympanic membrane, ear canal and external ear normal. There is no impacted cerumen. Mouth/Throat:      Pharynx: No oropharyngeal exudate. Eyes:      General: No scleral icterus. Right eye: No discharge. Left eye: No discharge. Conjunctiva/sclera: Conjunctivae normal.      Pupils: Pupils are equal, round, and reactive to light. Neck:      Thyroid: No thyromegaly. Cardiovascular:      Rate and Rhythm: Normal rate and regular rhythm. Heart sounds: Normal heart sounds. No murmur heard. Pulmonary:      Effort: Pulmonary effort is normal. No respiratory distress. Breath sounds: Normal breath sounds. No wheezing or rales. Chest:      Chest wall: No tenderness. Abdominal:      General: There is no distension. Palpations: Abdomen is soft. Tenderness: There is no abdominal tenderness. There is no rebound. Musculoskeletal:         General: No tenderness or deformity. Normal range of motion. Lymphadenopathy:      Cervical: No cervical adenopathy. Skin:     General: Skin is warm and dry. Coloration: Skin is not pale. Findings: No erythema or rash. Neurological:      Mental Status: She is alert and oriented to person, place, and time. Cranial Nerves: No cranial nerve deficit. Coordination: Coordination normal.   Psychiatric:         Behavior: Behavior normal.         Thought Content:  Thought content normal.         Judgment: Judgment normal.          Assessment and plan     Plan of care has been discussed with the patient, he agrees to the plan and verbalized understanding. All his questions were answered  More than 50% of the time spent in this visit was counseling the patient about  illness and treatment options         1. Elevated BP without diagnosis of hypertension  Improved blood pressure readings    2. Symptomatic spider varicose vein      3. Immunity status testing        - MUMPS AB, IGG; Future  - RUBELLA AB, IGG; Future  - RUBEOLA AB, IGG; Future  - VZV AB, IGG; Future  - HEP B SURFACE AB; Future  - HEP B SURFACE AB  - VZV AB, IGG  - RUBEOLA AB, IGG  - RUBELLA AB, IGG  - MUMPS AB, IGG    Current Outpatient Medications   Medication Sig Dispense Refill    BABY ASPIRIN PO Take  by mouth. (Patient not taking: Reported on 10/22/2021)         Patient Active Problem List    Diagnosis Date Noted    Obesity (BMI 30.0-34.9) 07/16/2019     Results for orders placed or performed in visit on 06/18/21   HEPATITIS C AB   Result Value Ref Range    Hep C Virus Ab None Detected None Detec   CBC WITH AUTOMATED DIFF   Result Value Ref Range    WBC 4.6 4.0 - 11.0 K/uL    RBC 4.27 3.80 - 5.20 M/uL    HGB 11.9 11.7 - 15.5 g/dL    HCT 38.4 35.1 - 46.5 %    MCV 90 81 - 99 fL    MCH 28 26 - 34 pg    MCHC 31 31 - 36 g/dL    RDW 14.2 10.0 - 15.5 %    PLATELET 292 119 - 466 K/uL    MPV 11.2 9.0 - 13.0 fL    NEUTROPHILS 46 40 - 75 %    Lymphocytes 44 20 - 45 %    MONOCYTES 8 3 - 12 %    EOSINOPHILS 2 0 - 6 %    BASOPHILS 1 0 - 2 %    ABS. NEUTROPHILS 2.1 1.8 - 7.7 K/uL    ABSOLUTE LYMPHOCYTE COUNT 2.0 1.0 - 4.8 K/uL    ABS. MONOCYTES 0.4 0.1 - 1.0 K/uL    ABS. EOSINOPHILS 0.1 0.0 - 0.5 K/uL    ABS. BASOPHILS 0.0 0.0 - 0.2 K/uL   METABOLIC PANEL, COMPREHENSIVE   Result Value Ref Range    Glucose 87 70 - 99 mg/dL    BUN 8 6 - 22 mg/dL    Creatinine 0.6 0.5 - 1.2 mg/dL    Sodium 139 133 - 145 mmol/L    Potassium 4.6 3.5 - 5.5 mmol/L    Chloride 104 98 - 110 mmol/L    CO2 26 20 - 32 mmol/L    AST (SGOT) 10 10 - 37 U/L    ALT (SGPT) 7 5 - 40 U/L    Alk.  phosphatase 96 25 - 115 U/L Bilirubin, total 0.4 0.2 - 1.2 mg/dL    Calcium 9.5 8.4 - 10.5 mg/dL    Protein, total 6.9 6.4 - 8.3 g/dL    Albumin 4.0 3.5 - 5.0 g/dL    A-G Ratio 1.4 1.1 - 2.6 ratio    Globulin 2.9 2.0 - 4.0 g/dL    Anion gap 9.0 3.0 - 15.0 mmol/L    GFRAA >60.0 >60.0    GFRNA >60.0 >60.0   LIPID PANEL   Result Value Ref Range    Triglyceride <28 (L) 40 - 149 mg/dL    HDL Cholesterol 44 >=40 mg/dL    Cholesterol, total 139 110 - 200 mg/dL    CHOLESTEROL/HDL 3.2 0.0 - 5.0    Non-HDL Cholesterol 95 <130 mg/dL    LDL, calculated      VLDL, calculated      LDL/HDL Ratio       No visits with results within 3 Month(s) from this visit. Latest known visit with results is:   Appointment on 06/18/2021   Component Date Value Ref Range Status    Hep C Virus Ab 06/18/2021 None Detected  None Detec Final    WBC 06/18/2021 4.6  4.0 - 11.0 K/uL Final    RBC 06/18/2021 4.27  3.80 - 5.20 M/uL Final    HGB 06/18/2021 11.9  11.7 - 15.5 g/dL Final    HCT 06/18/2021 38.4  35.1 - 46.5 % Final    MCV 06/18/2021 90  81 - 99 fL Final    MCH 06/18/2021 28  26 - 34 pg Final    MCHC 06/18/2021 31  31 - 36 g/dL Final    RDW 06/18/2021 14.2  10.0 - 15.5 % Final    PLATELET 61/52/8595 761  140 - 440 K/uL Final    MPV 06/18/2021 11.2  9.0 - 13.0 fL Final    NEUTROPHILS 06/18/2021 46  40 - 75 % Final    Lymphocytes 06/18/2021 44  20 - 45 % Final    MONOCYTES 06/18/2021 8  3 - 12 % Final    EOSINOPHILS 06/18/2021 2  0 - 6 % Final    BASOPHILS 06/18/2021 1  0 - 2 % Final    ABS. NEUTROPHILS 06/18/2021 2.1  1.8 - 7.7 K/uL Final    ABSOLUTE LYMPHOCYTE COUNT 06/18/2021 2.0  1.0 - 4.8 K/uL Final    ABS. MONOCYTES 06/18/2021 0.4  0.1 - 1.0 K/uL Final    ABS. EOSINOPHILS 06/18/2021 0.1  0.0 - 0.5 K/uL Final    ABS.  BASOPHILS 06/18/2021 0.0  0.0 - 0.2 K/uL Final    Glucose 06/18/2021 87  70 - 99 mg/dL Final    BUN 06/18/2021 8  6 - 22 mg/dL Final    Creatinine 06/18/2021 0.6  0.5 - 1.2 mg/dL Final    Sodium 06/18/2021 139  133 - 145 mmol/L Final    Potassium 06/18/2021 4.6  3.5 - 5.5 mmol/L Final    Chloride 06/18/2021 104  98 - 110 mmol/L Final    CO2 06/18/2021 26  20 - 32 mmol/L Final    AST (SGOT) 06/18/2021 10  10 - 37 U/L Final    ALT (SGPT) 06/18/2021 7  5 - 40 U/L Final    Alk. phosphatase 06/18/2021 96  25 - 115 U/L Final    Bilirubin, total 06/18/2021 0.4  0.2 - 1.2 mg/dL Final    Calcium 06/18/2021 9.5  8.4 - 10.5 mg/dL Final    Protein, total 06/18/2021 6.9  6.4 - 8.3 g/dL Final    Albumin 06/18/2021 4.0  3.5 - 5.0 g/dL Final    A-G Ratio 06/18/2021 1.4  1.1 - 2.6 ratio Final    Globulin 06/18/2021 2.9  2.0 - 4.0 g/dL Final    Anion gap 06/18/2021 9.0  3.0 - 15.0 mmol/L Final    Comment: Anion Gap calculation based on electrolyte reference ranges. Test includes Albumin, Alkaline Phosphatase, ALT, AST, BUN, Calcium, CO2,  Chloride, Creatinine, Glucose, Potassium, Sodium, Total Bilirubin and Total  Protein. Estimated GFR results are reported in mL/min/1.73 sq.m. by the MDRD equation. This eGFR is validated for stable chronic renal failure patients. This   equation  is unreliable in acute illness or patients with normal renal function.  GFRAA 06/18/2021 >60.0  >60.0 Final    GFRNA 06/18/2021 >60.0  >60.0 Final    Triglyceride 06/18/2021 <28* 40 - 149 mg/dL Final    HDL Cholesterol 06/18/2021 44  >=40 mg/dL Final    Cholesterol, total 06/18/2021 139  110 - 200 mg/dL Final    CHOLESTEROL/HDL 06/18/2021 3.2  0.0 - 5.0 Final    Non-HDL Cholesterol 06/18/2021 95  <130 mg/dL Final    LDL, calculated 06/18/2021    Final    Comment: Unable to calculate.  VLDL, calculated 06/18/2021    Final    Comment: Unable to calculate.  LDL/HDL Ratio 06/18/2021    Final    Comment: Unable to calculate. Test includes cholesterol, HDL cholesterol, triglycerides and LDL.     Cholesterol Recommended NCEP guidelines in mg/dL:    Less than 200            Desirable  200 - 239                Borderline High  Greater than or  = 240   High      Please Note:  Total Chol/HDL Ratio                     Men     Women  1/2 Avg. Risk    3.4     3.3      Avg. Risk    5.0     4.4  2X  Avg. Risk    9.6     7.1  3X  Avg.  Risk   23.4    11.0

## 2022-03-25 NOTE — PROGRESS NOTES
Madalynshanae Doctor is a 35 y.o. female (: 1980) presenting to address:    Chief Complaint   Patient presents with    Physical       Vitals:    22 1409 22 1418   BP: (!) 145/94 136/89   Resp: 16    Temp: 97.8 °F (36.6 °C)    TempSrc: Temporal    Weight: 205 lb 3.2 oz (93.1 kg)    Height: 5' 5\" (1.651 m)    PainSc:   0 - No pain        Hearing/Vision:   No exam data present    Learning Assessment:     Learning Assessment 2019   PRIMARY LEARNER Patient   PRIMARY LANGUAGE ENGLISH   LEARNER PREFERENCE PRIMARY DEMONSTRATION   ANSWERED BY patient   RELATIONSHIP SELF     Depression Screening:     3 most recent PHQ Screens 3/25/2022   Little interest or pleasure in doing things Not at all   Feeling down, depressed, irritable, or hopeless Not at all   Total Score PHQ 2 0     Fall Risk Assessment:     Fall Risk Assessment, last 12 mths 3/25/2022   Able to walk? Yes   Fall in past 12 months? 0   Do you feel unsteady? 0   Are you worried about falling 0     Abuse Screening:     Abuse Screening Questionnaire 3/25/2022   Do you ever feel afraid of your partner? N   Are you in a relationship with someone who physically or mentally threatens you? N   Is it safe for you to go home?  Y     ADL Assessment:     ADL Assessment 2019   Feeding yourself No Help Needed   Getting from bed to chair No Help Needed   Getting dressed No Help Needed   Bathing or showering No Help Needed   Walk across the room (includes cane/walker) No Help Needed   Using the telphone No Help Needed   Taking your medications No Help Needed   Preparing meals No Help Needed   Managing money (expenses/bills) No Help Needed   Moderately strenuous housework (laundry) No Help Needed   Shopping for personal items (toiletries/medicines) No Help Needed   Shopping for groceries No Help Needed   Driving No Help Needed   Climbing a flight of stairs No Help Needed   Getting to places beyond walking distances No Help Needed        Coordination of Care Questionaire:   1. \"Have you been to the ER, urgent care clinic since your last visit? Hospitalized since your last visit? \" No    2. \"Have you seen or consulted any other health care providers outside of the [address] [place] [address] since your last visit? \" Yes Where: [facility]     3. For patients aged 39-70: Has the patient had a colonoscopy? NA - based on age     If the patient is female:    4. For patients aged 41-77: Has the patient had a mammogram within the past 2 years? No    5. For patients aged 21-65: Has the patient had a pap smear? No    Advanced Directive:   1. Do you have an Advanced Directive? NO    2. Would you like information on Advanced Directives?  NO

## 2022-03-28 LAB
HBV SURFACE AB SER RIA-ACNC: NORMAL
MEV IGG SER IA-ACNC: 1.4 AI
MUV IGG SER-ACNC: 0.8 AI
RUBV IGG SERPL IA-ACNC: 1.8 AI
VZV IGM SER IA-ACNC: 2.7 AI

## 2022-03-29 NOTE — PROGRESS NOTES
Not immuned to hep B  Need vaccine   Not immuned  to Mumps Need Vaccine   Immuned to Varicella  Immuned to Rueola   Immuned to [place]

## 2022-03-31 NOTE — PROGRESS NOTES
Informed pt of results; pt scheduled for nurse visit on: Future Appointments  4/7/2022   3:15 PM    Deon Rosado MD      BSMA                BS [facility]B

## 2022-04-07 ENCOUNTER — CLINICAL SUPPORT (OUTPATIENT)
Dept: FAMILY MEDICINE CLINIC | Age: 42
End: 2022-04-07
Payer: COMMERCIAL

## 2022-04-07 VITALS — TEMPERATURE: 98 F

## 2022-04-07 DIAGNOSIS — Z23 ENCOUNTER FOR IMMUNIZATION: Primary | ICD-10-CM

## 2022-04-07 PROCEDURE — 90471 IMMUNIZATION ADMIN: CPT | Performed by: LEGAL MEDICINE

## 2022-04-07 PROCEDURE — 90707 MMR VACCINE SC: CPT | Performed by: LEGAL MEDICINE

## 2022-05-05 ENCOUNTER — CLINICAL SUPPORT (OUTPATIENT)
Dept: FAMILY MEDICINE CLINIC | Age: 42
End: 2022-05-05
Payer: COMMERCIAL

## 2022-05-05 DIAGNOSIS — Z23 ENCOUNTER FOR IMMUNIZATION: Primary | ICD-10-CM

## 2022-05-05 PROCEDURE — 90707 MMR VACCINE SC: CPT | Performed by: LEGAL MEDICINE

## 2022-05-05 NOTE — PROGRESS NOTES
Immunization MMR 2nd dose  administered 5/5/2022 by Bethany López / Lonnie Whitman by Chun Crowder LPN. Patient tolerated procedure well. No reactions noted.

## 2022-05-27 ENCOUNTER — CLINICAL SUPPORT (OUTPATIENT)
Dept: FAMILY MEDICINE CLINIC | Age: 42
End: 2022-05-27
Payer: COMMERCIAL

## 2022-05-27 VITALS — TEMPERATURE: 97.7 F

## 2022-05-27 DIAGNOSIS — Z23 ENCOUNTER FOR IMMUNIZATION: Primary | ICD-10-CM

## 2022-05-27 PROCEDURE — 90715 TDAP VACCINE 7 YRS/> IM: CPT | Performed by: LEGAL MEDICINE

## 2022-05-27 NOTE — PROGRESS NOTES
Immunization of the Tdap vaccine was administered 5/27/2022 by Chelo Collins LPN. Patient tolerated procedure well. No reactions noted.

## 2023-06-23 NOTE — PROGRESS NOTES
Avril Arredondo is a 35 y.o. female (: 1980) presenting to address:    Chief Complaint   Patient presents with    Immunization/Injection     MMR       Vitals:    22 1536   Temp: 98 °F (36.7 °C)   TempSrc: Temporal       Hearing/Vision:   No exam data present    Learning Assessment:     Learning Assessment 2019   PRIMARY LEARNER Patient   PRIMARY LANGUAGE ENGLISH   LEARNER PREFERENCE PRIMARY DEMONSTRATION   ANSWERED BY patient   RELATIONSHIP SELF     Depression Screening:     3 most recent PHQ Screens 2022   Little interest or pleasure in doing things Not at all   Feeling down, depressed, irritable, or hopeless Not at all   Total Score PHQ 2 0     Fall Risk Assessment:     Fall Risk Assessment, last 12 mths 2022   Able to walk? Yes   Fall in past 12 months? 0   Do you feel unsteady? 0   Are you worried about falling 0     Abuse Screening:     Abuse Screening Questionnaire 2022   Do you ever feel afraid of your partner? N   Are you in a relationship with someone who physically or mentally threatens you? N   Is it safe for you to go home? Y     ADL Assessment:     ADL Assessment 2019   Feeding yourself No Help Needed   Getting from bed to chair No Help Needed   Getting dressed No Help Needed   Bathing or showering No Help Needed   Walk across the room (includes cane/walker) No Help Needed   Using the telphone No Help Needed   Taking your medications No Help Needed   Preparing meals No Help Needed   Managing money (expenses/bills) No Help Needed   Moderately strenuous housework (laundry) No Help Needed   Shopping for personal items (toiletries/medicines) No Help Needed   Shopping for groceries No Help Needed   Driving No Help Needed   Climbing a flight of stairs No Help Needed   Getting to places beyond walking distances No Help Needed        Coordination of Care Questionaire:   1. \"Have you been to the ER, urgent care clinic since your last visit?   Hospitalized since your last visit? \" No    2. \"Have you seen or consulted any other health care providers outside of the [address] [place] [address] since your last visit? \" No     3. For patients aged 39-70: Has the patient had a colonoscopy? NA - based on age     If the patient is female:    4. For patients aged 41-77: Has the patient had a mammogram within the past 2 years? No    5. For patients aged 21-65: Has the patient had a pap smear? No    Advanced Directive:   1. Do you have an Advanced Directive? NO    2. Would you like information on Advanced Directives?  NO Detail Level: Simple Render Risk Assessment In Note?: no Comment: Much improved from only 1 month of ISO. Patient is ending month 1 and starting Month 2 at increased dose of 60mg. Patient only complains of dry lips. Recommended Dr. Dan 5-6 times daily play Aquaphor. Labs due before next visit.\\n\\nRTC: 1 month.